# Patient Record
Sex: FEMALE | Race: WHITE
[De-identification: names, ages, dates, MRNs, and addresses within clinical notes are randomized per-mention and may not be internally consistent; named-entity substitution may affect disease eponyms.]

---

## 2018-02-09 ENCOUNTER — HOSPITAL ENCOUNTER (EMERGENCY)
Dept: HOSPITAL 14 - H.ER | Age: 63
LOS: 1 days | Discharge: HOME | End: 2018-02-10
Payer: COMMERCIAL

## 2018-02-09 VITALS — RESPIRATION RATE: 16 BRPM | OXYGEN SATURATION: 97 %

## 2018-02-09 VITALS — BODY MASS INDEX: 22.4 KG/M2

## 2018-02-09 DIAGNOSIS — F41.9: ICD-10-CM

## 2018-02-09 DIAGNOSIS — N39.0: Primary | ICD-10-CM

## 2018-02-09 DIAGNOSIS — I10: ICD-10-CM

## 2018-02-09 DIAGNOSIS — J06.9: ICD-10-CM

## 2018-02-09 LAB
BASOPHILS # BLD AUTO: 0 K/UL (ref 0–0.2)
BASOPHILS NFR BLD: 0.4 % (ref 0–2)
BUN SERPL-MCNC: 17 MG/DL (ref 7–17)
CALCIUM SERPL-MCNC: 8.9 MG/DL (ref 8.4–10.2)
EOSINOPHIL # BLD AUTO: 0 K/UL (ref 0–0.7)
EOSINOPHIL NFR BLD: 0.1 % (ref 0–4)
ERYTHROCYTE [DISTWIDTH] IN BLOOD BY AUTOMATED COUNT: 14.2 % (ref 11.5–14.5)
GFR NON-AFRICAN AMERICAN: > 60
HGB BLD-MCNC: 11.1 G/DL (ref 12–16)
LYMPHOCYTES # BLD AUTO: 1 K/UL (ref 1–4.3)
LYMPHOCYTES NFR BLD AUTO: 8.3 % (ref 20–40)
MCH RBC QN AUTO: 28.3 PG (ref 27–31)
MCHC RBC AUTO-ENTMCNC: 33.3 G/DL (ref 33–37)
MCV RBC AUTO: 85 FL (ref 81–99)
MONOCYTES # BLD: 1.2 K/UL (ref 0–0.8)
MONOCYTES NFR BLD: 10.1 % (ref 0–10)
NEUTROPHILS # BLD: 9.6 K/UL (ref 1.8–7)
NEUTROPHILS NFR BLD AUTO: 81.1 % (ref 50–75)
NRBC BLD AUTO-RTO: 0 % (ref 0–0)
PLATELET # BLD: 127 K/UL (ref 130–400)
PMV BLD AUTO: 9.5 FL (ref 7.2–11.7)
RBC # BLD AUTO: 3.93 MIL/UL (ref 3.8–5.2)
WBC # BLD AUTO: 11.8 K/UL (ref 4.8–10.8)

## 2018-02-09 PROCEDURE — 96361 HYDRATE IV INFUSION ADD-ON: CPT

## 2018-02-09 PROCEDURE — 96374 THER/PROPH/DIAG INJ IV PUSH: CPT

## 2018-02-09 PROCEDURE — 99283 EMERGENCY DEPT VISIT LOW MDM: CPT

## 2018-02-09 PROCEDURE — 87040 BLOOD CULTURE FOR BACTERIA: CPT

## 2018-02-09 PROCEDURE — 87086 URINE CULTURE/COLONY COUNT: CPT

## 2018-02-09 PROCEDURE — 80048 BASIC METABOLIC PNL TOTAL CA: CPT

## 2018-02-09 PROCEDURE — 81003 URINALYSIS AUTO W/O SCOPE: CPT

## 2018-02-09 PROCEDURE — 85025 COMPLETE CBC W/AUTO DIFF WBC: CPT

## 2018-02-09 PROCEDURE — 87804 INFLUENZA ASSAY W/OPTIC: CPT

## 2018-02-10 VITALS — SYSTOLIC BLOOD PRESSURE: 115 MMHG | TEMPERATURE: 99.3 F | HEART RATE: 77 BPM | DIASTOLIC BLOOD PRESSURE: 58 MMHG

## 2018-02-10 LAB
ANISOCYTOSIS BLD QL SMEAR: SLIGHT
BILIRUB UR-MCNC: NEGATIVE MG/DL
COLOR UR: YELLOW
GLUCOSE UR STRIP-MCNC: (no result) MG/DL
HYPOCHROMIC: SLIGHT
LEUKOCYTE ESTERASE UR-ACNC: (no result) LEU/UL
LYMPHOCYTE: 10 % (ref 20–50)
MONOCYTE: 13 % (ref 0–10)
NEUTROPHILS NFR BLD AUTO: 74 % (ref 42–75)
NEUTS BAND NFR BLD: 2 % (ref 0–2)
OVALOCYTES BLD QL SMEAR: SLIGHT
PH UR STRIP: 6 [PH] (ref 5–8)
PLATELET # BLD EST: (no result) 10*3/UL
PROT UR STRIP-MCNC: 30 MG/DL
RBC # UR STRIP: (no result) /UL
SP GR UR STRIP: 1.02 (ref 1–1.03)
SQUAMOUS EPITHIAL: < 1 /HPF (ref 0–5)
STOMATOCYTES BLD QL SMEAR: SLIGHT
TOTAL CELLS COUNTED BLD: 100
URINE CLARITY: (no result)
URINE NITRATE: NEGATIVE
UROBILINOGEN UR-MCNC: 2 MG/DL (ref 0.2–1)
VARIANT LYMPHS NFR BLD MANUAL: 1 % (ref 0–0)

## 2018-02-10 NOTE — ED PDOC
HPI: General Adult


Time Seen by Provider: 02/09/18 23:00


Chief Complaint (Nursing): Flu-like Symptoms


Chief Complaint (Provider): Flu-like Symptoms


History Per: Patient


History/Exam Limitations: no limitations


Onset/Duration Of Symptoms: Hrs (x48 hours)


Current Symptoms Are (Timing): Still Present


Additional Complaint(s): 


 63 y/o female presents to the ED complaining of body aches, chills headache 

and fever x 48 hours. Reports an episode of abdominal pain and vomiting which 

resolved later.  Patient was seen by PMD (Dr. Malik) yesterday and was 

prescribed Augmentin and ibuprofen for presumed lung infection. Denies sore 

throat, ear pain, runny nose or any further medical complaints.








Past Medical History


Reviewed: Historical Data, Nursing Documentation, Vital Signs


Vital Signs: 


 Last Vital Signs











Temp  99.3 F   02/10/18 02:06


 


Pulse  77   02/10/18 02:06


 


Resp  16   02/10/18 02:06


 


BP  115/58 L  02/10/18 02:06


 


Pulse Ox  97   02/10/18 02:23














- Medical History


PMH: Anxiety, HTN





- Surgical History


Surgical History: No Surg Hx





- Family History


Family History: States: Unknown Family Hx





- Social History


Current smoker - smoking cessation education provided: No


Alcohol: None


Drugs: Denies





- Home Medications


Home Medications: 


 Ambulatory Orders











 Medication  Instructions  Recorded


 


Alprazolam [Xanax] 0.5 mg PO DAILY PRN 01/09/16


 


Levothyroxine Sodium [Unithroid] 0.1 mg PO DAILY 01/09/16


 


PARoxetine CR [Paxil CR] 12.5 mg PO DAILY 01/09/16


 


Propranolol Hydrochloride [Inderal 80 mg PO DAILY 01/09/16





LA]  


 


Simvastatin 10 mg PO DAILY 01/09/16


 


Nitrofurantoin Macrocrystals 100 mg PO BID 5 Days  cap 02/10/18





[Macrobid]  














- Allergies


Allergies/Adverse Reactions: 


 Allergies











Allergy/AdvReac Type Severity Reaction Status Date / Time


 


No Known Allergies Allergy   Verified 02/09/18 20:40














Review of Systems


ROS Statement: Except As Marked, All Systems Reviewed And Found Negative (As 

per HPI, otherwise negative)


Constitutional: Positive for: Fever, Chills, Other (Body aches)


ENT: Negative for: Ear Pain, Nose Discharge (runny nose), Throat Swelling (sore 

throat)


Gastrointestinal: Positive for: Vomiting, Abdominal Pain


Neurological: Positive for: Headache





Physical Exam





- Reviewed


Nursing Documentation Reviewed: Yes


Vital Signs Reviewed: Yes





- Physical Exam


Appears: Positive for: Well, Non-toxic, No Acute Distress


Head Exam: Positive for: ATRAUMATIC, NORMAL INSPECTION, NORMOCEPHALIC


Skin: Positive for: Normal Color, Warm (warm to touch), Dry


Eye Exam: Positive for: EOMI, Normal appearance, PERRL


ENT: Positive for: Normal ENT Inspection


Neck: Positive for: Normal, Painless ROM, Supple


Cardiovascular/Chest: Positive for: Regular Rate, Rhythm.  Negative for: Murmur


Respiratory: Positive for: Normal Breath Sounds.  Negative for: Accessory 

Muscle Use, Respiratory Distress


Gastrointestinal/Abdominal: Positive for: Normal Exam, Bowel Sounds, Soft


Back: Positive for: Normal Inspection.  Negative for: L CVA Tenderness, R CVA 

Tenderness, Vertebral Tenderness


Extremity: Positive for: Normal ROM.  Negative for: Deformity, Swelling


Neurologic/Psych: Positive for: Alert, Oriented (x3)





- Laboratory Results


Result Diagrams: 


 02/09/18 23:30





 02/09/18 23:30





- ECG


O2 Sat by Pulse Oximetry: 97 (RA)


Pulse Ox Interpretation: Normal





Medical Decision Making


Medical Decision Making: 


 Time: 23:09





Initial Impression: influenza 





Plan:


CBC w/ differential


Toradol 30mg IVP


Sodium chloride 1l IV


Blood culture


Influenza A B


Urinalysis








Time: 02:23





Upon provider reevaluation patient is feeling better, is medically stable, and 

requires no further treatment in the ED at this time. Patient will be 

discharged home. Counseling was provided and all questions were answered 

regarding diagnosis. There is agreement to discharge plan. Return if symptoms 

persist or worsen.  Told to stop Augmentin and take macrobid instead.





Clinical Impression: Influenza


--------------------------------------------------------------------------------

-----------------


Scribe Attestation:


Documented by Rodolfo Tapia acting as a scribe for Serge Beltran MD.


      


MD Scribe Attestation:


All medical record entries made by the Rose Marie were at my direction and 

personally dictated by me. I have reviewed the chart and agree that the record 

accurately reflects my personal performance of the history, physical exam, 

medical decision making, and the department course for this patient. I have 

also personally directed, reviewed, and agree with the discharge instructions 

and disposition.








Disposition





- Clinical Impression


Clinical Impression: 


 URI (upper respiratory infection), UTI (urinary tract infection)








- Disposition


Referrals: 


[facility] [place] [Outside]


Disposition: Routine/Home


Disposition Time: 02:23


Condition: IMPROVED


Prescriptions: 


Nitrofurantoin Macrocrystals [Macrobid] 100 mg PO BID 5 Days  cap


Instructions:  Urinary Tract Infection in Men (DC), Upper Respiratory Infection 

(ED)


Forms:  [facility] (English)

## 2019-02-27 ENCOUNTER — HOSPITAL ENCOUNTER (OUTPATIENT)
Dept: HOSPITAL 31 - C.LAB | Age: 64
End: 2019-02-27
Payer: COMMERCIAL

## 2019-02-27 DIAGNOSIS — D64.9: Primary | ICD-10-CM

## 2019-02-28 ENCOUNTER — HOSPITAL ENCOUNTER (OUTPATIENT)
Dept: HOSPITAL 31 - C.LAB | Age: 64
End: 2019-02-28
Payer: COMMERCIAL